# Patient Record
Sex: MALE
[De-identification: names, ages, dates, MRNs, and addresses within clinical notes are randomized per-mention and may not be internally consistent; named-entity substitution may affect disease eponyms.]

---

## 2020-04-29 NOTE — RAD
AP CHEST:

4/29/20

 

HISTORY: 

Cough. 

 

Lung fields appear clear. Heart and mediastinum unremarkable.

 

IMPRESSION: 

No acute findings. 

 

POS: [facility]

## 2020-05-13 NOTE — ULT
DOPPLER VENOUS ULTRASOUND BOTH LOWER EXTREMITIES: 

5/12/20

 

INDICATION:

Bilateral worsening pain and edema for one week. 

 

TECHNIQUE:  

Gray scale, color Doppler, and vascular duplex with spectral analysis was performed of the deep venou
s structures of both lower extremities. The common femoral vein, superficial femoral vein, popliteal 
vein, posterior tibial vein, proximal greater saphenous, and proximal profunda veins were assessed bi
laterally.

 

FINDINGS: 

There is normal compression and flow and augmentation seen within the deep venous structures of both 
lower extremities. There is soft tissue edema within subcutaneous tissues of the left lower extremity
. There is mildly prominent nonspecific lymph node measuring 3.3 x 0.7 cm within the left inguinal re
gion. 

 

IMPRESSION: 

1.      No evidence of DVT within both lower extremities.

2.      Subcutaneous edema of the left lower extremity. 

3.      Mildly prominent lymph node in left inguinal region.

 

POS: BH

## 2021-05-05 ENCOUNTER — HOSPITAL ENCOUNTER (EMERGENCY)
Dept: HOSPITAL 92 - ERS | Age: 31
Discharge: HOME | End: 2021-05-05
Payer: SELF-PAY

## 2021-05-05 DIAGNOSIS — L23.7: Primary | ICD-10-CM

## 2021-05-05 PROCEDURE — 99282 EMERGENCY DEPT VISIT SF MDM: CPT

## 2022-02-08 ENCOUNTER — HOSPITAL ENCOUNTER (EMERGENCY)
Dept: HOSPITAL 92 - ERS | Age: 32
Discharge: HOME | End: 2022-02-08
Payer: SELF-PAY

## 2022-02-08 DIAGNOSIS — Z20.822: ICD-10-CM

## 2022-02-08 DIAGNOSIS — R19.7: ICD-10-CM

## 2022-02-08 DIAGNOSIS — R51.9: Primary | ICD-10-CM

## 2022-02-08 DIAGNOSIS — R50.9: ICD-10-CM

## 2022-02-08 PROCEDURE — U0005 INFEC AGEN DETEC AMPLI PROBE: HCPCS

## 2022-02-08 PROCEDURE — 99284 EMERGENCY DEPT VISIT MOD MDM: CPT

## 2022-02-08 PROCEDURE — U0003 INFECTIOUS AGENT DETECTION BY NUCLEIC ACID (DNA OR RNA); SEVERE ACUTE RESPIRATORY SYNDROME CORONAVIRUS 2 (SARS-COV-2) (CORONAVIRUS DISEASE [COVID-19]), AMPLIFIED PROBE TECHNIQUE, MAKING USE OF HIGH THROUGHPUT TECHNOLOGIES AS DESCRIBED BY CMS-2020-01-R: HCPCS
